# Patient Record
(demographics unavailable — no encounter records)

---

## 2025-06-27 NOTE — HISTORY OF PRESENT ILLNESS
[FreeTextEntry1] :  Pt returns for a follow-up visit for osteoporosis. No interval health changes. No major surgeries, hospitalizations, fractures or changes in medication. Up to date with dentist. No major dental work planned.   Patient had first dose of intravenous Reclast April 2021, second dose of IV Reclast in December 2022, tolerated well, third dose of IV Reclast 09/2024, tolerated well.  Bone mineral density February 2020 reported spine -0.9 which is presumably a false elevation due to arthritis total hip -2.8 femoral neck -2.6.  The patient has no history of osteoporosis related fractures.  She has no unusual risk factors for osteoporosis. Patient initially tried oral Actonel and delayed release risedronate but did not tolerate due to upper GI side effects. Patient had a single dose of intravenous Reclast April 2021, tolerated well. No significant APR. Repeat bone density April 2022 performed in radiology.  Images reviewed. Spine reported as -1.0 normal but clearly this is falsely elevated due to degenerative changes.  Total hip -2.3, +5.4%.  Femoral neck -2.1 no significant change. Patient had second dose of IV Reclast in December 2022, tolerated well. Pt. received third dose of IV Reclast 09/2024, tolerated well.  Pt was hospitalized at [facility] for A-fib. Was given Amiodarone and Eliquis to treat A-fib.  Patient was hospitalized in March 2024 and had right public ramus fx and left sacral insufficient fx from standing height. Was in rehab for a month, no surgery required.   back

## 2025-06-27 NOTE — ASSESSMENT
[FreeTextEntry1] : 86 y/o female with low bone density consistent with osteoporosis in hip.  The patient did not tolerate standard Actonel or delayed release risedronate. The patient had first dose of intravenous Reclast spring 2021. Tolerated well. No interval fractures. Repeat bone density 2022 moderately increased total hip. Pt had second IV Reclast dose in December 2022. BMD 12/2023 indicated stable osteopenia in the total hip and femoral neck. Normal prox radius.   Patient was hospitalized in March 2024 and had right public ramus fx and left sacral insufficient fx from standing height, no surgery required. Pt. received third dose of IV Reclast 09/2024, tolerated well. BMD 06/2025 shows stable osteopenia in total hip and femoral neck, and decreased osteopenia in the prox radius. BMD reviewed with pt., and I recommend delaying next dose of IV Reclast due to stable bone density and to prevent any side effects.  Risk low thyroid. Patient is clinically euthyroid. Previously had trivially elevated TSH 04/2021 4.34. TSH 12/2023 4.63. TSH 06/2025 6.57. Subclinical hyperthyroidism, Repeat TSH in 6 months.  Labs: 06/2025 Creatinine: 0.97 Calcium: 10.0 TSH: 6.57  Exam mild scoliosis  Follow up in 1 year with BMD.

## 2025-06-27 NOTE — PROCEDURE
[FreeTextEntry1] : Bone Mineral Density: 06/27/2025 Indication: vs 2023 to assess response to medication Spine: not performed Total hip: -1.9 osteopenia, no significant change Femoral neck: -2.0 osteopenia, no significant change Proximal radius: -1.2 osteopenia (-5.4%)  Bone Mineral Density: 12/19/2023  Indication: Comparison to outside study 2022 Spine: not performed Total hip: -1.9, osteopenia, -2.1 prior report Femoral neck: -2.1, osteopenia, -2.3 prior report  Proximal radius: -0.6, normal, no prior report        Bone Density April 19, 2022 Spine  -1.0 normal appears arthritic and falsely elevated  Total Hip -2.1 osteopenia  Femoral Neck -2.3 osteopenia

## 2025-06-27 NOTE — PAST MEDICAL HISTORY
[Menopause Age____] : age at menopause was [unfilled] [History of Hormone Replacement Treatment] : has a history of hormone replacement treatment [de-identified] : HRT until [facility]

## 2025-06-27 NOTE — PHYSICAL EXAM
[Alert] : alert [Well Nourished] : well nourished [No Acute Distress] : no acute distress [Well Developed] : well developed [Normal Sclera/Conjunctiva] : normal sclera/conjunctiva [EOMI] : extra ocular movement intact [No Proptosis] : no proptosis [Thyroid Not Enlarged] : the thyroid was not enlarged [No Thyroid Nodules] : no palpable thyroid nodules [Clear to Auscultation] : lungs were clear to auscultation bilaterally [Normal S1, S2] : normal S1 and S2 [Normal Rate] : heart rate was normal [Regular Rhythm] : with a regular rhythm [No Edema] : no peripheral edema [Normal Bowel Sounds] : normal bowel sounds [Not Tender] : non-tender [Not Distended] : not distended [Soft] : abdomen soft [Normal Anterior Cervical Nodes] : no anterior cervical lymphadenopathy [No Spinal Tenderness] : no spinal tenderness [Kyphosis] : kyphosis present [Scoliosis] : scoliosis present [No Stigmata of Cushings Syndrome] : no stigmata of Cushings Syndrome [Normal Gait] : normal gait [Normal Reflexes] : deep tendon reflexes were 2+ and symmetric [No Tremors] : no tremors [Oriented x3] : oriented to person, place, and time [de-identified] : sl unsteady gait [de-identified] : Mild Kyphoscoliosis

## 2025-06-27 NOTE — END OF VISIT
[FreeTextEntry3] : This note was written by Kristen Hernandez on (June 27, 2025) acting as a medical scribe for Dr. Peters. This note was authored by the medical scribe for me. I have reviewed, edited, and revised the note as needed. I am in agreement with the exam findings, imaging findings, and treatment plan. Memo Peters MD